# Patient Record
Sex: FEMALE | Race: WHITE | Employment: OTHER | URBAN - METROPOLITAN AREA
[De-identification: names, ages, dates, MRNs, and addresses within clinical notes are randomized per-mention and may not be internally consistent; named-entity substitution may affect disease eponyms.]

---

## 2017-01-06 PROBLEM — N18.2 CHRONIC RENAL INSUFFICIENCY, STAGE 2 (MILD): Status: ACTIVE | Noted: 2017-01-06

## 2017-01-13 PROBLEM — N18.30 CHRONIC RENAL IMPAIRMENT, STAGE 3 (MODERATE) (HCC): Status: ACTIVE | Noted: 2017-01-13

## 2017-01-13 PROBLEM — N18.2 CHRONIC RENAL INSUFFICIENCY, STAGE 2 (MILD): Status: RESOLVED | Noted: 2017-01-06 | Resolved: 2017-01-13

## 2017-01-20 PROBLEM — Z51.11 ENCOUNTER FOR ANTINEOPLASTIC CHEMOTHERAPY: Status: ACTIVE | Noted: 2017-01-20

## 2017-01-26 PROBLEM — Z85.3 HISTORY OF BREAST CANCER: Status: ACTIVE | Noted: 2017-01-26

## 2017-01-27 PROBLEM — D61.2 APLASTIC ANEMIA DUE TO TOXIC CAUSE (HCC): Status: ACTIVE | Noted: 2017-01-27

## 2017-02-24 PROBLEM — I44.7 LBBB (LEFT BUNDLE BRANCH BLOCK): Status: ACTIVE | Noted: 2017-02-24

## 2017-02-24 PROBLEM — I35.9 AORTIC VALVE DISEASE: Status: ACTIVE | Noted: 2017-02-24

## 2017-03-17 ENCOUNTER — ANESTHESIA EVENT (OUTPATIENT)
Dept: ENDOSCOPY | Facility: HOSPITAL | Age: 82
End: 2017-03-17
Payer: MEDICARE

## 2017-03-21 ENCOUNTER — HOSPITAL ENCOUNTER (OUTPATIENT)
Facility: HOSPITAL | Age: 82
Setting detail: HOSPITAL OUTPATIENT SURGERY
Discharge: HOME | End: 2017-03-21
Attending: INTERNAL MEDICINE | Admitting: INTERNAL MEDICINE
Payer: MEDICARE

## 2017-03-21 ENCOUNTER — ANESTHESIA (OUTPATIENT)
Dept: ENDOSCOPY | Facility: HOSPITAL | Age: 82
End: 2017-03-21
Payer: MEDICARE

## 2017-03-21 ENCOUNTER — SURGERY (OUTPATIENT)
Age: 82
End: 2017-03-21

## 2017-03-21 VITALS
BODY MASS INDEX: 33.66 KG/M2 | TEMPERATURE: 97 F | RESPIRATION RATE: 18 BRPM | OXYGEN SATURATION: 97 % | HEIGHT: 63 IN | DIASTOLIC BLOOD PRESSURE: 80 MMHG | WEIGHT: 190 LBS | HEART RATE: 72 BPM | SYSTOLIC BLOOD PRESSURE: 156 MMHG

## 2017-03-21 DIAGNOSIS — C20 MALIGNANT NEOPLASM OF RECTUM (HCC): ICD-10-CM

## 2017-03-21 LAB — GLUCOSE BLD-MCNC: 90 MG/DL (ref 65–99)

## 2017-03-21 PROCEDURE — 0DJD8ZZ INSPECTION OF LOWER INTESTINAL TRACT, VIA NATURAL OR ARTIFICIAL OPENING ENDOSCOPIC: ICD-10-PCS | Performed by: INTERNAL MEDICINE

## 2017-03-21 PROCEDURE — 82962 GLUCOSE BLOOD TEST: CPT

## 2017-03-21 RX ORDER — NALOXONE HYDROCHLORIDE 0.4 MG/ML
80 INJECTION, SOLUTION INTRAMUSCULAR; INTRAVENOUS; SUBCUTANEOUS AS NEEDED
Status: DISCONTINUED | OUTPATIENT
Start: 2017-03-21 | End: 2017-03-21

## 2017-03-21 RX ORDER — SODIUM CHLORIDE, SODIUM LACTATE, POTASSIUM CHLORIDE, CALCIUM CHLORIDE 600; 310; 30; 20 MG/100ML; MG/100ML; MG/100ML; MG/100ML
INJECTION, SOLUTION INTRAVENOUS CONTINUOUS
Status: DISCONTINUED | OUTPATIENT
Start: 2017-03-21 | End: 2017-03-21

## 2017-03-21 RX ORDER — DEXTROSE MONOHYDRATE 25 G/50ML
50 INJECTION, SOLUTION INTRAVENOUS
Status: DISCONTINUED | OUTPATIENT
Start: 2017-03-21 | End: 2017-03-21

## 2017-03-21 RX ORDER — HYDROMORPHONE HYDROCHLORIDE 1 MG/ML
0.4 INJECTION, SOLUTION INTRAMUSCULAR; INTRAVENOUS; SUBCUTANEOUS EVERY 5 MIN PRN
Status: DISCONTINUED | OUTPATIENT
Start: 2017-03-21 | End: 2017-03-21

## 2017-03-21 NOTE — ANESTHESIA POSTPROCEDURE EVALUATION
[address] Patient Status:  Hospital Outpatient Surgery   Age/Gender 80year old female MRN [id number]   Location [address] [place] [address]. Attending Allyn Lynch MD   Hosp Day # 0 PCP Aura Bentley MD       Anesthesia Post-op

## 2017-03-21 NOTE — BRIEF OP NOTE
[facility] ([facility] ENDOSCOPY  Brief Op Note     Ethel Camp Location: OR   [facility] [id number] MRN [id number]   Admission Date 3/21/2017 Operation Date 3/21/2017   Attending Physician Dorcas Johnson MD Operating Physician Ankita Crowley MD       Pre-Operative D

## 2017-03-21 NOTE — ANESTHESIA PREPROCEDURE EVALUATION
PRE-OP EVALUATION    Patient Name: John West    Pre-op Diagnosis: Malignant neoplasm of rectum ([address] [C20]    Procedure(s):  RECTAL ENDOSCOPIC ULTRASOUND (EUS) WITH FLEXIBLE SIGMOIDOSCOPY, WITH FINE NEEDLE ASPIRATION      Surgeon(s) and Role:     * Ala 62mm Hg. 3. Mitral valve: Mildly calcified annulus. There is mild regurgitation. 4. Left atrium: The atrium is mildly dilated. 5. Right ventricle: Estimation of the right ventricular systolic pressure is     within the normal range.   6. Pericardium, ext normal.                 Other findings            ASA: 3   Plan: MAC  NPO status verified and patient meets guidelines. Post-procedure pain management plan discussed with surgeon and patient.       Plan/risks discussed with: patient                Presen

## 2017-03-22 NOTE — OPERATIVE REPORT
[facility]    PATIENT'S NAME: Yfn Mohan   ATTENDING PHYSICIAN: Tito Maharaj M.D. OPERATING PHYSICIAN: Tito Maharaj M.D.    PATIENT ACCOUNT#:   [de-identified]    LOCATION:  ENDO  ENDO POOL ROOMS [address] [facility] #:   [id number] serosa. This could represent postradiation-induced changes. No obvious perirectal lymphadenopathy seen. The scope was then removed. IMPRESSION:  Significant endoscopic and endoscintigraphic response to chemotherapy and radiation.     RECOMMENDATIONS

## 2017-05-10 PROCEDURE — 36415 COLL VENOUS BLD VENIPUNCTURE: CPT | Performed by: COLON & RECTAL SURGERY

## 2017-05-10 PROCEDURE — 82378 CARCINOEMBRYONIC ANTIGEN: CPT | Performed by: COLON & RECTAL SURGERY

## 2017-09-04 PROBLEM — I70.0 ATHEROSCLEROSIS OF AORTA (HCC): Status: ACTIVE | Noted: 2017-09-04

## 2018-04-05 PROBLEM — I50.32 CHRONIC DIASTOLIC CHF (CONGESTIVE HEART FAILURE) (HCC): Status: ACTIVE | Noted: 2018-04-05

## 2018-04-12 PROCEDURE — 81001 URINALYSIS AUTO W/SCOPE: CPT | Performed by: FAMILY MEDICINE

## 2018-04-12 PROCEDURE — 87086 URINE CULTURE/COLONY COUNT: CPT | Performed by: FAMILY MEDICINE

## 2018-04-12 PROCEDURE — 87186 SC STD MICRODIL/AGAR DIL: CPT | Performed by: FAMILY MEDICINE

## 2018-04-12 PROCEDURE — 87088 URINE BACTERIA CULTURE: CPT | Performed by: FAMILY MEDICINE

## 2018-04-13 PROCEDURE — 82378 CARCINOEMBRYONIC ANTIGEN: CPT | Performed by: INTERNAL MEDICINE

## 2018-04-16 PROCEDURE — 87086 URINE CULTURE/COLONY COUNT: CPT | Performed by: FAMILY MEDICINE

## 2018-04-16 PROCEDURE — 81001 URINALYSIS AUTO W/SCOPE: CPT | Performed by: FAMILY MEDICINE

## 2018-04-23 PROCEDURE — 87147 CULTURE TYPE IMMUNOLOGIC: CPT | Performed by: FAMILY MEDICINE

## 2018-04-23 PROCEDURE — 87077 CULTURE AEROBIC IDENTIFY: CPT | Performed by: FAMILY MEDICINE

## 2018-04-23 PROCEDURE — 81001 URINALYSIS AUTO W/SCOPE: CPT | Performed by: FAMILY MEDICINE

## 2018-04-23 PROCEDURE — 87086 URINE CULTURE/COLONY COUNT: CPT | Performed by: FAMILY MEDICINE

## 2018-06-04 RX ORDER — ACETAMINOPHEN 325 MG/1
325 TABLET ORAL AS NEEDED
COMMUNITY
End: 2019-05-22

## 2018-06-17 ENCOUNTER — ANESTHESIA EVENT (OUTPATIENT)
Dept: ENDOSCOPY | Facility: HOSPITAL | Age: 83
End: 2018-06-17
Payer: MEDICARE

## 2018-06-17 NOTE — ANESTHESIA PREPROCEDURE EVALUATION
PRE-OP EVALUATION    Patient Name: Shayna Espinal    Pre-op Diagnosis: History of rectal cancer [Z85.048]    Procedure(s):   FLEXIBLE SIGMOIDOSCOPY/RECTAL ENDOSCOPIC ULTRASOUND WITH FINE NEEDLE ASPIRATION      Surgeon(s) and Role:     Sandeep Garcia MD - of myocardial infarction.  - Normal regional wall thickening is noted on gated SPECT analysis. - Normal ejection fraction.     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 Patient has not taken beta blockers in last 24 hours. Post-procedure pain management plan discussed with surgeon and patient.     Comment: MAC with bkup GA  Risks and benefits of GA including sorethroat,allergy,nausea, vomiting,dental tr

## 2018-06-18 ENCOUNTER — SURGERY (OUTPATIENT)
Age: 83
End: 2018-06-18

## 2018-06-18 ENCOUNTER — HOSPITAL ENCOUNTER (OUTPATIENT)
Facility: HOSPITAL | Age: 83
Setting detail: HOSPITAL OUTPATIENT SURGERY
Discharge: HOME | End: 2018-06-18
Attending: INTERNAL MEDICINE | Admitting: INTERNAL MEDICINE
Payer: MEDICARE

## 2018-06-18 ENCOUNTER — ANESTHESIA (OUTPATIENT)
Dept: ENDOSCOPY | Facility: HOSPITAL | Age: 83
End: 2018-06-18
Payer: MEDICARE

## 2018-06-18 VITALS
HEIGHT: 63 IN | SYSTOLIC BLOOD PRESSURE: 164 MMHG | RESPIRATION RATE: 18 BRPM | WEIGHT: 190 LBS | TEMPERATURE: 99 F | DIASTOLIC BLOOD PRESSURE: 75 MMHG | OXYGEN SATURATION: 96 % | BODY MASS INDEX: 33.66 KG/M2 | HEART RATE: 66 BPM

## 2018-06-18 DIAGNOSIS — Z85.048 HISTORY OF RECTAL CANCER: ICD-10-CM

## 2018-06-18 PROCEDURE — 88305 TISSUE EXAM BY PATHOLOGIST: CPT | Performed by: INTERNAL MEDICINE

## 2018-06-18 PROCEDURE — 0DJD8ZZ INSPECTION OF LOWER INTESTINAL TRACT, VIA NATURAL OR ARTIFICIAL OPENING ENDOSCOPIC: ICD-10-PCS | Performed by: INTERNAL MEDICINE

## 2018-06-18 PROCEDURE — BD4CZZZ ULTRASONOGRAPHY OF RECTUM: ICD-10-PCS | Performed by: INTERNAL MEDICINE

## 2018-06-18 PROCEDURE — 0DBP8ZX EXCISION OF RECTUM, VIA NATURAL OR ARTIFICIAL OPENING ENDOSCOPIC, DIAGNOSTIC: ICD-10-PCS | Performed by: INTERNAL MEDICINE

## 2018-06-18 RX ORDER — DEXTROSE MONOHYDRATE 25 G/50ML
50 INJECTION, SOLUTION INTRAVENOUS
Status: CANCELLED | OUTPATIENT
Start: 2018-06-18

## 2018-06-18 RX ORDER — NALOXONE HYDROCHLORIDE 0.4 MG/ML
80 INJECTION, SOLUTION INTRAMUSCULAR; INTRAVENOUS; SUBCUTANEOUS AS NEEDED
Status: CANCELLED | OUTPATIENT
Start: 2018-06-18 | End: 2018-06-18

## 2018-06-18 RX ORDER — SODIUM CHLORIDE, SODIUM LACTATE, POTASSIUM CHLORIDE, CALCIUM CHLORIDE 600; 310; 30; 20 MG/100ML; MG/100ML; MG/100ML; MG/100ML
INJECTION, SOLUTION INTRAVENOUS CONTINUOUS
Status: DISCONTINUED | OUTPATIENT
Start: 2018-06-18 | End: 2018-06-18

## 2018-06-18 RX ORDER — SODIUM CHLORIDE, SODIUM LACTATE, POTASSIUM CHLORIDE, CALCIUM CHLORIDE 600; 310; 30; 20 MG/100ML; MG/100ML; MG/100ML; MG/100ML
INJECTION, SOLUTION INTRAVENOUS CONTINUOUS
Status: CANCELLED | OUTPATIENT
Start: 2018-06-18

## 2018-06-18 NOTE — H&P
[facility] Department of  Gastroenterology  Update Health History :       Genevia Mad  female   Earnest Jacobs MD     [id number]  3/24/1935 Primary Care Physician  Aura Bentley MD        HPI :  Personal history of rectal cancer status post Encounter:  HYDROcodone-acetaminophen 5-325 MG Oral Tab TK 1-2 TS PO Q 6 H PRF PAIN Disp: 30 tablet Rfl: 0   hydrocortisone 2.5 % Rectal Cream Insert into rectum 2-3 times a day (Patient taking differently: Insert into rectum 2-3 times a day---- pt uses pr

## 2018-06-18 NOTE — OPERATIVE REPORT
PATIENT NAME: Love Mojica  MRN: [id number]  DATE OF OPERATION: 6/18/2018  PREOPERATIVE DIAGNOSIS:   1. History of rectal cancer status post external beam radiation and chemo. She is here for follow-up. She occasionally complains of rectal bleeding.   POST

## 2018-06-18 NOTE — ANESTHESIA POSTPROCEDURE EVALUATION
[address] [place] [address],[address] Patient Status:  Hospital Outpatient Surgery   Age/Gender 80year old female MRN [id number]   Location [facility]. Attending Margarita Holstein, MD   Hosp Day # 0 PCP Desire Marlow MD       Anesthesia Post-op

## 2018-06-20 NOTE — PROGRESS NOTES
The biopsy of the previously treated rectal cancer was negative. She will need a flexible sigmoidoscopy and endoscopic ultrasound in 6 month.   2 enemas

## 2018-07-11 NOTE — PROGRESS NOTES
7/10/2018  Re[place] [address] [phone]    Dear Joan Huggins,     The  biopsy/pathology findings from your flexible sigmoidoscopy:  1.   Well heal prior rectal cancer site without evidence of recurrent disease    Follow-up informatio

## 2018-08-14 PROBLEM — N18.4 CKD (CHRONIC KIDNEY DISEASE) STAGE 4, GFR 15-29 ML/MIN (HCC): Status: ACTIVE | Noted: 2018-08-14

## 2018-08-20 PROBLEM — Z51.11 ENCOUNTER FOR ANTINEOPLASTIC CHEMOTHERAPY: Status: RESOLVED | Noted: 2017-01-20 | Resolved: 2018-08-20

## 2018-12-14 ENCOUNTER — ANESTHESIA EVENT (OUTPATIENT)
Dept: ENDOSCOPY | Facility: HOSPITAL | Age: 83
End: 2018-12-14
Payer: MEDICARE

## 2018-12-17 ENCOUNTER — ANESTHESIA (OUTPATIENT)
Dept: ENDOSCOPY | Facility: HOSPITAL | Age: 83
End: 2018-12-17
Payer: MEDICARE

## 2018-12-17 ENCOUNTER — HOSPITAL ENCOUNTER (OUTPATIENT)
Facility: HOSPITAL | Age: 83
Setting detail: HOSPITAL OUTPATIENT SURGERY
Discharge: HOME | End: 2018-12-17
Attending: INTERNAL MEDICINE | Admitting: INTERNAL MEDICINE
Payer: MEDICARE

## 2018-12-17 VITALS
TEMPERATURE: 98 F | BODY MASS INDEX: 34.55 KG/M2 | HEIGHT: 63 IN | HEART RATE: 74 BPM | OXYGEN SATURATION: 96 % | DIASTOLIC BLOOD PRESSURE: 63 MMHG | WEIGHT: 195 LBS | SYSTOLIC BLOOD PRESSURE: 133 MMHG | RESPIRATION RATE: 18 BRPM

## 2018-12-17 DIAGNOSIS — C20 RECTAL CANCER (HCC): ICD-10-CM

## 2018-12-17 PROCEDURE — 0DJD8ZZ INSPECTION OF LOWER INTESTINAL TRACT, VIA NATURAL OR ARTIFICIAL OPENING ENDOSCOPIC: ICD-10-PCS | Performed by: INTERNAL MEDICINE

## 2018-12-17 PROCEDURE — 88305 TISSUE EXAM BY PATHOLOGIST: CPT | Performed by: INTERNAL MEDICINE

## 2018-12-17 PROCEDURE — 0DBP8ZX EXCISION OF RECTUM, VIA NATURAL OR ARTIFICIAL OPENING ENDOSCOPIC, DIAGNOSTIC: ICD-10-PCS | Performed by: INTERNAL MEDICINE

## 2018-12-17 PROCEDURE — 88342 IMHCHEM/IMCYTCHM 1ST ANTB: CPT | Performed by: INTERNAL MEDICINE

## 2018-12-17 RX ORDER — DEXTROSE MONOHYDRATE 25 G/50ML
50 INJECTION, SOLUTION INTRAVENOUS
Status: DISCONTINUED | OUTPATIENT
Start: 2018-12-17 | End: 2018-12-17

## 2018-12-17 RX ORDER — SODIUM CHLORIDE, SODIUM LACTATE, POTASSIUM CHLORIDE, CALCIUM CHLORIDE 600; 310; 30; 20 MG/100ML; MG/100ML; MG/100ML; MG/100ML
INJECTION, SOLUTION INTRAVENOUS CONTINUOUS
Status: DISCONTINUED | OUTPATIENT
Start: 2018-12-17 | End: 2018-12-17

## 2018-12-17 RX ORDER — NALOXONE HYDROCHLORIDE 0.4 MG/ML
80 INJECTION, SOLUTION INTRAMUSCULAR; INTRAVENOUS; SUBCUTANEOUS AS NEEDED
Status: DISCONTINUED | OUTPATIENT
Start: 2018-12-17 | End: 2018-12-17

## 2018-12-17 NOTE — ANESTHESIA PREPROCEDURE EVALUATION
PRE-OP EVALUATION    Patient Name: Jude Wilkinson    Pre-op Diagnosis: Rectal cancer ([facility] [address] [C20]    Procedure(s):  rectal ENDOSCOPIC ULTRASOUND (EUS), FLEXIBLE SIGMOIDOSCOPY      Surgeon(s) and Role:     Renae Mackey MD - Primary    Pre-op robert Rodríguez • OTHER SURGICAL HISTORY      angiogram of heart early 80's     • OTHER SURGICAL HISTORY      Previous staph infection of R knee   • PORT, INDWELLING, IMP       Social History    Tobacco Use      Smoking status: Never Smoker      Smokeless tobacco: Never

## 2018-12-17 NOTE — ANESTHESIA POSTPROCEDURE EVALUATION
[address] Patient Status:  Hospital Outpatient Surgery   Age/Gender 80year old female MRN [id number]   Location [address] [place] [address]. Attending Allyn Lynch MD   Hosp Day # 0 PCP Aura Bentley MD       Anesthesia Post-op

## 2018-12-17 NOTE — OPERATIVE REPORT
PATIENT NAME: Ya Morales  MRN: [id number]  DATE OF OPERATION: 12/17/2018  PREOPERATIVE DIAGNOSIS:   1. Rectal cancer T3 N0 status post chemotherapy and radiation. Patient finished her treatment in early 2017.   She has been undergoing surveillance flexibl MARYANNE Ar[facility]eta [facility] Gastroenterology

## 2018-12-20 NOTE — PROGRESS NOTES
Ms. Hema Leslie,    The biopsy/pathology findings from your recent Flexible Sigmoidoscopy, Endoscopic Ultrasound  examination showed:    1. Well healed prior rectal cancer site without recurrence  2.  Normal-appearing endoscopic ultrasound without obvious abnorma

## 2019-05-22 PROBLEM — I50.32 CHRONIC DIASTOLIC CHF (CONGESTIVE HEART FAILURE) (HCC): Status: RESOLVED | Noted: 2018-04-05 | Resolved: 2019-05-22

## 2019-05-22 PROBLEM — C16.0 MALIGNANT NEOPLASM OF CARDIA (HCC): Status: RESOLVED | Noted: 2019-05-22 | Resolved: 2019-05-22

## 2019-05-22 PROBLEM — C16.0 MALIGNANT NEOPLASM OF CARDIA (HCC): Status: ACTIVE | Noted: 2019-05-22

## 2019-06-13 RX ORDER — CEPHALEXIN 500 MG/1
500 CAPSULE ORAL DAILY
COMMUNITY

## 2019-06-21 ENCOUNTER — ANESTHESIA EVENT (OUTPATIENT)
Dept: ENDOSCOPY | Facility: HOSPITAL | Age: 84
End: 2019-06-21
Payer: MEDICARE

## 2019-06-25 ENCOUNTER — HOSPITAL ENCOUNTER (OUTPATIENT)
Facility: HOSPITAL | Age: 84
Setting detail: HOSPITAL OUTPATIENT SURGERY
Discharge: HOME | End: 2019-06-25
Attending: INTERNAL MEDICINE | Admitting: INTERNAL MEDICINE
Payer: MEDICARE

## 2019-06-25 ENCOUNTER — ANESTHESIA (OUTPATIENT)
Dept: ENDOSCOPY | Facility: HOSPITAL | Age: 84
End: 2019-06-25
Payer: MEDICARE

## 2019-06-25 VITALS
RESPIRATION RATE: 16 BRPM | WEIGHT: 195 LBS | HEIGHT: 63 IN | HEART RATE: 84 BPM | OXYGEN SATURATION: 94 % | BODY MASS INDEX: 34.55 KG/M2 | TEMPERATURE: 98 F | SYSTOLIC BLOOD PRESSURE: 90 MMHG | DIASTOLIC BLOOD PRESSURE: 46 MMHG

## 2019-06-25 DIAGNOSIS — Z85.048 HISTORY OF RECTAL CANCER: ICD-10-CM

## 2019-06-25 PROCEDURE — 88305 TISSUE EXAM BY PATHOLOGIST: CPT | Performed by: INTERNAL MEDICINE

## 2019-06-25 PROCEDURE — 0DJD8ZZ INSPECTION OF LOWER INTESTINAL TRACT, VIA NATURAL OR ARTIFICIAL OPENING ENDOSCOPIC: ICD-10-PCS | Performed by: INTERNAL MEDICINE

## 2019-06-25 PROCEDURE — BD4CZZZ ULTRASONOGRAPHY OF RECTUM: ICD-10-PCS | Performed by: INTERNAL MEDICINE

## 2019-06-25 PROCEDURE — 0DBP8ZX EXCISION OF RECTUM, VIA NATURAL OR ARTIFICIAL OPENING ENDOSCOPIC, DIAGNOSTIC: ICD-10-PCS | Performed by: INTERNAL MEDICINE

## 2019-06-25 RX ORDER — DEXTROSE MONOHYDRATE 25 G/50ML
50 INJECTION, SOLUTION INTRAVENOUS
Status: DISCONTINUED | OUTPATIENT
Start: 2019-06-25 | End: 2019-06-25

## 2019-06-25 RX ORDER — SODIUM CHLORIDE, SODIUM LACTATE, POTASSIUM CHLORIDE, CALCIUM CHLORIDE 600; 310; 30; 20 MG/100ML; MG/100ML; MG/100ML; MG/100ML
INJECTION, SOLUTION INTRAVENOUS CONTINUOUS
Status: DISCONTINUED | OUTPATIENT
Start: 2019-06-25 | End: 2019-06-25

## 2019-06-25 RX ORDER — NALOXONE HYDROCHLORIDE 0.4 MG/ML
80 INJECTION, SOLUTION INTRAMUSCULAR; INTRAVENOUS; SUBCUTANEOUS AS NEEDED
Status: DISCONTINUED | OUTPATIENT
Start: 2019-06-25 | End: 2019-06-25

## 2019-06-25 NOTE — OPERATIVE REPORT
PATIENT NAME: Ya Morales  MRN: [id number]  DATE OF OPERATION: 6/25/2019  PREOPERATIVE DIAGNOSIS:   1. Rectal cancer T3 N0 status post chemotherapy and radiation. Patient finished her treatment in early 2017.   She has been undergoing surveillance flexible appeared to be some slight thickening of the rectal wall. No obvious infiltrative disease seen. No obvious perirectal lymphadenopathy noted. Scope was then removed. IMPRESSION:  1. Findings as described above    RECOMMENDATIONS:  1.  Await final biopsy

## 2019-06-25 NOTE — ANESTHESIA POSTPROCEDURE EVALUATION
[address] Patient Status:  Hospital Outpatient Surgery   Age/Gender 80year old female MRN [id number]   Location [facility]. Attending Delmis Goel MD   Hosp Day # 0 PCP Janes Hurt MD       Anesthesia Post-op

## 2019-06-25 NOTE — ANESTHESIA PREPROCEDURE EVALUATION
PRE-OP EVALUATION    Patient Name: Emigdio Carter    Pre-op Diagnosis: History of rectal cancer [Z85.048]    Procedure(s):  RECTAL ENDOSCOPIC ULTRASOUND  FLEXIBLE SIGMOIDOSCOPY      Surgeon(s) and Role:     Rashad Boone MD - Primary    Pre-op vitals r ENDOSCOPY   • ENDOSCOPIC ULTRASOUND (EUS) N/A 3/21/2017    Performed by Ben Correia MD at [address] [place] [address] 12/17/2018    Performed by Ben Correia MD at [address] [place] [address] N/A 6/18/2018    Performed

## 2019-06-25 NOTE — H&P
[facility] [facility] Department of  Gastroenterology  Update Health History :       Genevia Mad  female   Earnest Jacobs MD     [id number]  3/24/1935 Primary Care Physician  Aura Bentley MD        HPI :  Rectal cancer status post chemotherapy and ra Problem Relation Age of Onset   • Cancer Father         lung   • Other (old age) Mother    • Diabetes Brother    • Other (suicide) Brother    • Heart Disorder Son 35        MI      Social History    Tobacco Use      Smoking status: Never Smoker      Smok were all discussed, all questions were answered, patient verbalized understanding and agreed to proceed with procedure as scheduled.       Jannie Greco MD

## 2019-06-27 NOTE — PROGRESS NOTES
6/27/2019  Erica Lewis [address] [place] [phone]    Dear Miles Moreno,     The biopsy/pathology findings from your flexible sigmoidoscopy showed:  Healed prior rectal cancer site without evidence of recurrent disease.     Follow-up informatio

## 2019-09-05 PROBLEM — E61.1 IRON DEFICIENCY: Status: ACTIVE | Noted: 2019-09-05

## 2020-01-28 PROBLEM — D61.2 APLASTIC ANEMIA DUE TO TOXIC CAUSE (HCC): Status: RESOLVED | Noted: 2017-01-27 | Resolved: 2020-01-28

## 2021-04-06 PROBLEM — S82.142D TIBIAL PLATEAU FRACTURE, LEFT, CLOSED, WITH ROUTINE HEALING, SUBSEQUENT ENCOUNTER: Status: ACTIVE | Noted: 2021-04-06

## 2021-04-06 PROBLEM — M17.12 PRIMARY OSTEOARTHRITIS OF LEFT KNEE: Status: ACTIVE | Noted: 2021-04-06

## 2021-06-22 PROBLEM — N18.30 CHRONIC RENAL IMPAIRMENT, STAGE 3 (MODERATE) (HCC): Status: RESOLVED | Noted: 2017-01-13 | Resolved: 2021-06-22

## 2021-06-22 PROBLEM — G62.0 CHEMOTHERAPY-INDUCED NEUROPATHY (HCC): Status: ACTIVE | Noted: 2021-06-22

## 2021-06-22 PROBLEM — T45.1X5A CHEMOTHERAPY-INDUCED NEUROPATHY (HCC): Status: ACTIVE | Noted: 2021-06-22

## 2022-12-20 NOTE — LETTER
[facility]  [address] [facility], [address] [place] [address]    Consent for Operation    Date: __________________    Time: _______________    1.  I authorize the performance upon Severo Palin the following operation:    Procedure(s):  RECTAL ENDOSCOPIC ULTR procedure has been videotaped, the surgeon will obtain the original videotape. The hospital will not be responsible for storage or maintenance of this tape.     6. For the purpose of advancing medical education, I consent to the admittance of observers to t STATEMENTS REQUIRING INSERTION OR COMPLETION WERE FILLED IN.     Signature of Patient:   ___________________________    When the patient is a minor or mentally incompetent to give consent:  Signature of person authorized to consent for patient: ____________ drugs/illegal medications). Failure to inform my anesthesiologist about these medicines may increase my risk of anesthetic complications. · If I am allergic to anything or have had a reaction to anesthesia before.     3. I understand how the anesthesia med I have discussed the procedure and information above with the patient (or patient’s representative) and answered their questions. The patient or their representative has agreed to have anesthesia services.     _______________________________________________ Aklief counseling:  Patient advised to apply a pea-sized amount only at bedtime and wait 30 minutes after washing their face before applying.  If too drying, patient may add a non-comedogenic moisturizer.  The most commonly reported side effects including irritation, redness, scaling, dryness, stinging, burning, itching, and increased risk of sunburn.  The patient verbalized understanding of the proper use and possible adverse effects of retinoids.  All of the patient's questions and concerns were addressed.

## (undated) DEVICE — DISPOSABLE DISTAL ATTACHMENT: Brand: DISPOSABLE DISTAL ATTACHMENT

## (undated) DEVICE — FILTERLINE NASAL ADULT O2/CO2

## (undated) DEVICE — 1200CC GUARDIAN II: Brand: GUARDIAN

## (undated) DEVICE — FORCEP RADIAL JAW 4

## (undated) DEVICE — ENDOSCOPY PACK UPPER: Brand: MEDLINE INDUSTRIES, INC.

## (undated) DEVICE — FORCEP BIOPSY RJ4 LG CAP W/ND

## (undated) DEVICE — Device: Brand: DEFENDO AIR/WATER/SUCTION AND BIOPSY VALVE

## (undated) DEVICE — 3M™ RED DOT™ MONITORING ELECTRODE WITH FOAM TAPE AND STICKY GEL, 50/BAG, 20/CASE, 72/PLT 2570: Brand: RED DOT™

## (undated) DEVICE — ENDOSCOPY PACK - LOWER: Brand: MEDLINE INDUSTRIES, INC.

## (undated) DEVICE — "MH-438 A/W VLVE F/140 EVIS-140": Brand: AIR/WATER VALVE

## (undated) NOTE — LETTER
[facility] Testing Department  Phone: [phone]  Right Fax: [phone]  [facility] 20 By:  Dr. Angelo Araujo Date: 3/15/17    Patient Name: Cammie Andersen  Surgery Date: 3/21/2017    CSN: [id number]  Medical Record: [id number]

## (undated) NOTE — LETTER
12/21/2018          Erica Lewis [address] [place] [phone]    Ms. Reed,    The biopsy/pathology findings from your recent Flexible Sigmoidoscopy, Endoscopic Ultrasound  examination showed:    1.  Well healed prior rectal cancer sit

## (undated) NOTE — LETTER
[facility]  Adanalfa [address] [facility], [address] [place] [address]    Consent for Operation    Date: __________________    Time: _______________    1.  I authorize the performance upon Xenia Mejia the following operation:    Procedure(s):  RECTAL ENDOSCOPIC ULTR procedure has been videotaped, the surgeon will obtain the original videotape. The hospital will not be responsible for storage or maintenance of this tape.     6. For the purpose of advancing medical education, I consent to the admittance of observers to t STATEMENTS REQUIRING INSERTION OR COMPLETION WERE FILLED IN.     Signature of Patient:   ___________________________    When the patient is a minor or mentally incompetent to give consent:  Signature of person authorized to consent for patient: ____________ drugs/illegal medications). Failure to inform my anesthesiologist about these medicines may increase my risk of anesthetic complications. · If I am allergic to anything or have had a reaction to anesthesia before.     3. I understand how the anesthesia med I have discussed the procedure and information above with the patient (or patient’s representative) and answered their questions. The patient or their representative has agreed to have anesthesia services.     _______________________________________________

## (undated) NOTE — LETTER
7/10/2018          Reagan Bella [address] [place] [phone]    Dear Joan Huggins,     The  biopsy/pathology findings from your flexible sigmoidoscopy:  1.   Well heal prior rectal cancer site without evidence of recurrent disease    Follow-u

## (undated) NOTE — LETTER
6/27/2019          Jeb [place] [address] [place] [phone]    Dear Noemy Calabrese,     The biopsy/pathology findings from your flexible sigmoidoscopy showed:  Healed prior rectal cancer site without evidence of recurrent disease.     Follow-u

## (undated) NOTE — LETTER
[facility] Testing Department  Phone: [phone]  OUTSIDE TESTING RESULT REQUEST      TO:  Medical Record Department Today's Date: 3/15/17    FAX #: [phone]     IMPORTANT: FOR YOUR IMMEDIATE ATTENTION  Please FAX all test results listed

## (undated) NOTE — IP AVS SNAPSHOT
[facility] [place] [address] [place], [address] ~ [phone]                Discharge Summary   3/21/2017    Melissa Castillo           Admission Information        Provider Department    3/21/2017 Joe Gerber MD  Endoscopy - You may notice some rectal \"spotting\" (a little blood on the toilet tissue) for a day or two after the exam. This is normal.  - If you experience any rectal bleeding (not spotting), persistent tenderness or sharp severe abdominal pains, oral temperatur Basophil Absolu    (02/24/17)  72.5 (02/24/17)  20.7 (02/24/17)  5.4 (02/24/17)  1.0 -- -- (02/24/17)  5.09 (02/24/17)  1.45 (02/24/17)  0.38 (02/24/17)  0.07 (02/24/17)  0.03    (02/09/17)  78.5 (02/09/17)  11.2 (02/09/17)  7.7 (02/09/17)  2.2   (02/09/17 We want to hear from you       We want to hear from you, please share your experience with us by returning the survey you will receive in the mail. Thank you!         Isabella

## (undated) NOTE — LETTER
[facility] Testing Department  Phone: [phone]  OUTSIDE TESTING RESULT REQUEST      TO:   Tiffany Suggs Today's Date: 3/15/17    FAX #: [phone]     IMPORTANT: FOR YOUR IMMEDIATE ATTENTION  Please FAX all test results listed below to